# Patient Record
Sex: FEMALE | Race: BLACK OR AFRICAN AMERICAN | NOT HISPANIC OR LATINO | URBAN - METROPOLITAN AREA
[De-identification: names, ages, dates, MRNs, and addresses within clinical notes are randomized per-mention and may not be internally consistent; named-entity substitution may affect disease eponyms.]

---

## 2023-11-13 ENCOUNTER — TELEPHONE (OUTPATIENT)
Dept: NEUROLOGY | Facility: CLINIC | Age: 23
End: 2023-11-13
Payer: MEDICAID

## 2023-11-13 NOTE — TELEPHONE ENCOUNTER
----- Message from Alyssa Pelaez sent at 11/13/2023  2:14 PM CST -----  Pt's mother called to reschedule the appointment to a later date. Call back number is [phone] or .[phone]. Thx. EL

## 2023-11-27 ENCOUNTER — TELEPHONE (OUTPATIENT)
Dept: PRIMARY CARE CLINIC | Facility: CLINIC | Age: 23
End: 2023-11-27
Payer: MEDICAID

## 2023-11-27 NOTE — TELEPHONE ENCOUNTER
----- Message from Violette Velazquez sent at 11/27/2023  1:37 PM CST -----  Contact: Alejandro//Mom  Type:  Same Day Appointment Request    Caller is requesting a same day appointment.   Name of Caller:Alejandro  When is the first available appointment?11/27/23  Symptoms:ref Neurology/seizures in sleep/fainting  Best Call Back Number:[phone]  Additional Information: Patient's mom request patient is seen today. Please give Mom an immediate call back.   Thank you,  GH

## 2023-11-27 NOTE — TELEPHONE ENCOUNTER
----- Message from Karson Quiroga sent at 11/27/2023  2:49 PM CST -----  Contact: self  [phone]  Patient is returning a phone call.  Who left a message for the patient: Katlin combs,  Does patient know what this is regarding:    Would you like a call back, or a response through your [facility] portal?:   call  Comments:     Please call and advise

## 2023-11-27 NOTE — TELEPHONE ENCOUNTER
Spoke with patient and mother to schedule appt on 12/11/23 at 2:40 pm for neurology referral. She voiced understanding.

## 2023-12-11 ENCOUNTER — TELEPHONE (OUTPATIENT)
Dept: PRIMARY CARE CLINIC | Facility: CLINIC | Age: 23
End: 2023-12-11
Payer: MEDICAID

## 2023-12-11 NOTE — TELEPHONE ENCOUNTER
Returned call to patient in regards to message . Scheduled pt 12/15/23 at 10:20am she voiced understanding.

## 2023-12-11 NOTE — TELEPHONE ENCOUNTER
----- Message from Lynne Heredia sent at 12/11/2023 11:29 AM CST -----  Contact: Tresa @ [phone]  [email or web address]     Patient is calling for Medical Advice regarding:Patient is in the hosp and mom would like to reschedule cancel appointment. Please call and advise     How long has patient had these symptoms:    Pharmacy name and phone#:    Would like response via [employer]:  call     Comments:

## 2023-12-15 ENCOUNTER — PATIENT MESSAGE (OUTPATIENT)
Dept: PRIMARY CARE CLINIC | Facility: CLINIC | Age: 23
End: 2023-12-15

## 2023-12-15 ENCOUNTER — TELEPHONE (OUTPATIENT)
Dept: PRIMARY CARE CLINIC | Facility: CLINIC | Age: 23
End: 2023-12-15
Payer: MEDICAID

## 2023-12-15 NOTE — TELEPHONE ENCOUNTER
I called the patient to inform her that the provider will not be in clinic to reschedule her appointment there was no answer- LVM.